# Patient Record
(demographics unavailable — no encounter records)

---

## 2024-12-02 NOTE — ASSESSMENT
[FreeTextEntry1] : We reviewed the findings and the history. Questions were answered and concerns addressed. The options were outlined. ST vs LT issues discussed.  She will follow up in 12 months with repeat xrays of both shoulders.  Stretching encouraged.   Patient seen by Dr. Kamaljit Kumar, who determined the assessment and plan. Sienna GIORDANO participated in the care of the patient, including the history and physical exam. IJie, am scribing for Dr. Kamaljit Kumar in his presence for the chief complaint, physical exam, studies, assessment, and/or plan.

## 2024-12-02 NOTE — REASON FOR VISIT
[FreeTextEntry2] : This is a 72 year old RHD female with left shoulder pain that started without injury in October 2024.  She had a bone spur shaved with Dr. Bahena many years ago.  She recovered well, but never regained full mobility.  Sleeping is OK.  Reaching is sore.  She feels like it gets stuck.

## 2024-12-02 NOTE — PHYSICAL EXAM
[Left] : left shoulder [Sitting] : sitting [Mild] : mild [5 ___] : forward flexion 5[unfilled]/5 [5___] : external rotation 5[unfilled]/5 [] : no sensory deficits [TWNoteComboBox4] : passive forward flexion 145 degrees [TWNoteComboBox6] : internal rotation L2 [de-identified] : external rotation 30 degrees

## 2024-12-02 NOTE — IMAGING
[Left] : left shoulder [FreeTextEntry1] : 2V: There is GH narrowing with an inferior spur.  There are loose bodies.  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 [FreeTextEntry5] : 2V: there is a Type I-II acromion.

## 2024-12-02 NOTE — HISTORY OF PRESENT ILLNESS
[Gradual] : gradual [7] : 7 [4] : 4 [Dull/Aching] : dull/aching [Sharp] : sharp [Frequent] : frequent [Leisure] : leisure [Rest] : rest [Exercising] : exercising [] : Post Surgical Visit: no [FreeTextEntry1] : left shoulder